# Patient Record
Sex: MALE | Race: OTHER | NOT HISPANIC OR LATINO | Employment: OTHER | URBAN - METROPOLITAN AREA
[De-identification: names, ages, dates, MRNs, and addresses within clinical notes are randomized per-mention and may not be internally consistent; named-entity substitution may affect disease eponyms.]

---

## 2024-11-07 ENCOUNTER — TELEPHONE (OUTPATIENT)
Age: 89
End: 2024-11-07

## 2024-11-07 DIAGNOSIS — M06.09 RHEUMATOID ARTHRITIS WITHOUT RHEUMATOID FACTOR, MULTIPLE SITES (HCC): Primary | ICD-10-CM

## 2024-11-07 NOTE — TELEPHONE ENCOUNTER
Patient states his current routine lab orders have .  Please place new lab orders to [facility].  He will go for the lab work tomorrow.

## 2024-11-20 ENCOUNTER — OFFICE VISIT (OUTPATIENT)
Age: 89
End: 2024-11-20
Payer: COMMERCIAL

## 2024-11-20 VITALS
BODY MASS INDEX: 30.63 KG/M2 | WEIGHT: 190.6 LBS | HEIGHT: 66 IN | TEMPERATURE: 97.2 F | OXYGEN SATURATION: 97 % | HEART RATE: 63 BPM | DIASTOLIC BLOOD PRESSURE: 62 MMHG | SYSTOLIC BLOOD PRESSURE: 116 MMHG

## 2024-11-20 DIAGNOSIS — Z79.899 ENCOUNTER FOR LONG-TERM (CURRENT) USE OF MEDICATIONS: ICD-10-CM

## 2024-11-20 DIAGNOSIS — M15.0 PRIMARY GENERALIZED (OSTEO)ARTHRITIS: ICD-10-CM

## 2024-11-20 DIAGNOSIS — M06.09 RHEUMATOID ARTHRITIS OF MULTIPLE SITES WITHOUT RHEUMATOID FACTOR (HCC): Primary | ICD-10-CM

## 2024-11-20 DIAGNOSIS — G56.01 CARPAL TUNNEL SYNDROME OF RIGHT WRIST: ICD-10-CM

## 2024-11-20 PROCEDURE — 99214 OFFICE O/P EST MOD 30 MIN: CPT | Performed by: PHYSICIAN ASSISTANT

## 2024-11-20 NOTE — PROGRESS NOTES
Name: Jovon Benítez      : 1932      MRN: [id number]  Encounter Provider: Pippa Gallagher PA-C  Encounter Date: 2024   Encounter department: [facility] RHEUMATOLOGY [facility]  :  Assessment & Plan  Rheumatoid arthritis of multiple sites without rheumatoid factor (HCC)  His rheumatoid arthritis symptoms are well-controlled with subcutaneous methotrexate injections.  No signs of active inflammation or synovitis on exam today.  We will continue to monitor his response to treatment.  Labs as ordered every 3 months to monitor for medication side effects and toxicity.  Follow-up in 6 months or sooner if needed.    Orders:    CBC and differential; Standing    Comprehensive metabolic panel; Standing    Sedimentation rate, automated; Standing    C-reactive protein; Standing    Carpal tunnel syndrome of right wrist  Chronic carpal tunnel symptoms in his right wrist.  He did have an injection done in May however this did not provide any significant relief for him.  We did discuss additional treatment options including a referral to occupational therapy or orthopedics.  At this time he would like to start with a home exercise program.       Primary generalized (osteo)arthritis  Generalized osteoarthritis symptoms are stable.  Encouraged continued home exercise program.       Encounter for long-term (current) use of medications    Orders:    CBC and differential; Standing    Comprehensive metabolic panel; Standing    Sedimentation rate, automated; Standing    C-reactive protein; Standing        History of Present Illness     Jovon Benítez is a 89 y.o. male.  He is here for follow-up of his seronegative erosive rheumatoid arthritis.  He was initially started on oral methotrexate in 2014.  In  he was switched to subcutaneous methotrexate.  He is feeling well and his rheumatoid arthritis symptoms are stable with methotrexate.  He has minimal stiffness in the morning.  He has no  swelling in his joints.  He has a history of osteoarthritis as well.  These symptoms have been stable.  He does exercise regularly and stays active.     He has symptoms of carpal tunnel in his right hand.  He had an injection done by Dr. Payan in May.  Unfortunately this did not provide any significant relief for him.  He continues to have numbness in his right hand.     He has a history of reflux symptoms and a Schatzki's ring.  This was dilated with the scope and balloon with EGD done in December 2021.  His reflux and dysphagia symptoms have significantly improved and are stable.   He is also following with Urology.  He is currently on Flomax for incomplete emptying of his bladder.        He follows with his PCP for history of type 2 diabetes and hypertension.    He had labs done on 11/8/2024.  H/H11.1/32.4.  Glucose 112.  Creatinine 1.19, GFR 57.  ESR, CRP within normal limits.          Review of Systems   Constitutional:  Negative for chills, fatigue and fever.   HENT:  Negative for hearing loss, sore throat and tinnitus.    Eyes:  Negative for pain and visual disturbance.        Dry eyes   Respiratory:  Negative for cough and shortness of breath.    Cardiovascular:  Negative for chest pain and palpitations.   Gastrointestinal:  Negative for abdominal pain, nausea and vomiting.   Genitourinary:  Negative for difficulty urinating.   Musculoskeletal:  Positive for arthralgias and back pain (stable). Negative for gait problem, joint swelling, myalgias, neck pain and neck stiffness.   Skin:  Negative for rash.   Neurological:  Negative for dizziness, seizures, weakness, numbness and headaches.   Psychiatric/Behavioral:  Negative for confusion, decreased concentration and sleep disturbance.      Current Outpatient Medications on File Prior to Visit   Medication Sig Dispense Refill    aspirin (ECOTRIN LOW STRENGTH) 81 mg EC tablet Take 1 tablet by mouth daily      atorvastatin (LIPITOR) 20 mg tablet Take 20 mg by  "mouth daily      B-D INS SYR MICROFINE 1CC/28G 28G X 1/2\" 1 ML MISC USE EVERY 7 DAYS TO ADMINISTER SUBCUTANEOUSLY WEEKLY. 10 each 1    bumetanide (BUMEX) 2 mg tablet Take 0.5 tablets by mouth daily       Cholecalciferol (D3 Dots) 50 MCG (2000 UT) TBDP Take 2,000 Units by mouth      enalapril (VASOTEC) 20 mg tablet Take 1 tablet by mouth daily       ferrous sulfate 325 (65 Fe) mg tablet Take 1 tablet by mouth 2 (two) times a day with meals       finasteride (PROSCAR) 5 mg tablet Take 1 tablet by mouth daily      folic acid (FOLVITE) 1 mg tablet TAKE 1 TABLET BY MOUTH EVERY DAY 90 tablet 1    methotrexate 50 MG/2ML injection INJECT 0.7 ML (17.5 MG TOTAL) UNDER THE SKIN EVERY 7 DAYS 8 mL 1    metoprolol succinate (TOPROL-XL) 25 mg 24 hr tablet Take 1 tablet by mouth daily       Tuberculin-Allergy Syringes (B-D ALLERGY SYRINGE 1CC/28G) 28G X 1/2\" 1 ML MISC Use every 7 days Use to admin SQ MTX weekly 10 each 1    Accu-Chek Pinky Plus test strip USE AS DIRECTED TO TEST BLOOD GLUCOSE LEVELS DAILY (Patient not taking: Reported on 11/20/2024)      acetaminophen (TYLENOL) 325 mg tablet Take 2 tablets (650 mg total) by mouth every 6 (six) hours as needed for mild pain (Patient not taking: Reported on 11/20/2024) 30 tablet 0    meloxicam (MOBIC) 15 mg tablet Take 15 mg by mouth daily (Patient not taking: Reported on 11/20/2024)      pantoprazole (PROTONIX) 40 mg tablet Take 40 mg by mouth daily (Patient not taking: Reported on 11/20/2024)      tamsulosin (FLOMAX) 0.4 mg Take 0.4 mg by mouth (Patient not taking: Reported on 5/16/2024)      vitamin B-12 (VITAMIN B-12) 500 mcg tablet Take 1 tablet (500 mcg total) by mouth daily (Patient not taking: Reported on 11/20/2024) 90 tablet 1     No current facility-administered medications on file prior to visit.         Objective   /62 (BP Location: Left arm, Patient Position: Sitting, Cuff Size: Adult)   Pulse 63   Temp (!) 97.2 °F (36.2 °C) (Temporal)   Ht 5' 5.5\" (1.664 m)   " Wt 86.5 kg (190 lb 9.6 oz)   SpO2 97%   BMI 31.24 kg/m²      Physical Exam  Constitutional:       Appearance: Normal appearance.   Cardiovascular:      Rate and Rhythm: Normal rate and regular rhythm.   Pulmonary:      Breath sounds: Normal breath sounds.   Musculoskeletal:      Comments: Neck decreased lateral flexion and rotation.  Spasm noted posterior cervical and shoulder girdle muscles.  Shoulders limited abduction and external rotation.  Bilateral shoulder crepitus.  Elbows full range of motion.  Wrists full range of motion.  Tinel's negative bilaterally.  Prominent ulnar styloids with no synovitis.  Hands squaring 1st carpometacarpal joints bilaterally with Heberden's and Julio's nodes.  No synovitis appreciated.  Straight leg raising negative bilaterally.  Hips full range of motion.  Knees full range of motion with patellofemoral crepitus and early varus deformities.  Ankles full range of motion.  Feet hyperpronation.  No synovitis noted.     Skin:     General: Skin is warm and dry.   Neurological:      General: No focal deficit present.      Mental Status: He is alert.           Dragon Dictation software was used to dictate this note. It may contain errors with dictating incorrect words/spelling. Please contact provider directly for any questions.

## 2024-11-20 NOTE — PATIENT INSTRUCTIONS
Patient Education     Carpal Tunnel Exercises   About this topic   Carpal tunnel syndrome is a very common health problem. It is most often caused by doing hand or wrist movements over and over. It can also be caused by using the lower arm muscles too much.  The carpal tunnel is the small area in your wrist that your median nerve runs through. A tough band of tissues called a ligament holds everything in place over the carpal tunnel. Your median nerve runs from your neck through your lower arm into your hand. If this nerve is squeezed at the wrist area, you may feel pain and have other signs. Your hand, fingers, and wrist may feel weak, numb, or tingly. This is called carpal tunnel syndrome.  Your doctor may want you to try exercises to help your signs. Other times, you will do these exercises after surgery.  General   Before starting with a program, ask your doctor if you are healthy enough to do these exercises. Your doctor may have you work with a  or physical therapist to make a safe exercise program to meet your needs.  Stretching Exercises   Stretching exercises keep your muscles flexible. They also stop them from getting tight. Start by doing each of these stretches 2 to 3 times. In order for your body to make changes, you will need to hold these stretches for 20 to 30 seconds. Repeat each exercise 2 to 3 times each day. Do all exercises slowly.  Wrist stretches bending back ? Straighten your elbow and have your palm facing up. Keeping your elbow straight, bend your wrist back so that your fingers are now pointing to the floor. Grab your hand with your other hand and push back the wrist until you feel a stretch. If you just had surgery, you should not do this exercise until your therapist or doctor tells you it is OK.  Strengthening Exercises   Strengthening exercises keep your muscles firm and strong. Sit while doing these exercises. Be sure to use good posture. Start by repeating each exercise 2 to 3  times. Work up to doing each exercise 10 times. Try to do the exercises 2 to 3 times each day. Do all exercises slowly.  Tendon gliding exercises using 4 positions ? Start by holding your hand with your fingers straight. Then, bend only the last two joints of your fingers and move your fingers into a hook or claw position. Next, straighten your fingers and bend your knuckles to make a flat table top position. This is also called the duckbill position. Last, make a full fist. Moving your hand into all 4 positions is one exercise.  Wrist exercises:  Side-to-side ? Hold one arm still using your other hand. Move your hand from side to side.  Up and down ? Hold one arm still using your other hand. Bend your wrist up and down.  Wrist circles ? Move each wrist in a [place] in one direction. Now, move each wrist in a [place] in the other direction.           What will the results be?   Less pain, pressure, stiffness, and swelling in your wrist and hand  Ease numbness and tingling in your hand and fingers  Increased blood flow to the nerves, muscles, and joints of your wrist and hand to help healing  Increased hand and  strength  Keep your muscles and joints strong and flexible  Helpful tips   Stay active and work out to keep your muscles strong and flexible.  Be sure you do not hold your breath when exercising. This can raise your blood pressure. If you tend to hold your breath, try counting out loud when exercising. If any exercise bothers you, stop right away.  Always warm up before stretching. Heated muscles stretch much easier than cool muscles. Stretching cool muscles can lead to injury.  Try walking and swinging your arms at an easy pace for a few minutes to warm up your muscles. Do this again after exercising.  Never bounce when doing stretches.  Doing exercises before a meal may be a good way to get into a routine.  Exercise may be slightly uncomfortable, but you should not have sharp pains. If you do get sharp  pains, stop what you are doing. If the sharp pains continue, call your doctor.  Last Reviewed Date   2021-05-10  Consumer Information Use and Disclaimer   This generalized information is a limited summary of diagnosis, treatment, and/or medication information. It is not meant to be comprehensive and should be used as a tool to help the user understand and/or assess potential diagnostic and treatment options. It does NOT include all information about conditions, treatments, medications, side effects, or risks that may apply to a specific patient. It is not intended to be medical advice or a substitute for the medical advice, diagnosis, or treatment of a health care provider based on the health care provider's examination and assessment of a patient’s specific and unique circumstances. Patients must speak with a health care provider for complete information about their health, medical questions, and treatment options, including any risks or benefits regarding use of medications. This information does not endorse any treatments or medications as safe, effective, or approved for treating a specific patient. Up[employer], Inc[employer] and its affiliates disclaim any warranty or liability relating to this information or the use thereof. The use of this information is governed by the Terms of Use, available at https://www.woltersSoundCureuwer.com/en/know/clinical-effectiveness-terms   Copyright   Copyright © 2024 UpToDate, Inc. and its affiliates and/or licensors. All rights reserved.

## 2024-11-20 NOTE — ASSESSMENT & PLAN NOTE
Chronic carpal tunnel symptoms in his right wrist.  He did have an injection done in May however this did not provide any significant relief for him.  We did discuss additional treatment options including a referral to occupational therapy or orthopedics.  At this time he would like to start with a home exercise program.

## 2024-11-20 NOTE — ASSESSMENT & PLAN NOTE
His rheumatoid arthritis symptoms are well-controlled with subcutaneous methotrexate injections.  No signs of active inflammation or synovitis on exam today.  We will continue to monitor his response to treatment.  Labs as ordered every 3 months to monitor for medication side effects and toxicity.  Follow-up in 6 months or sooner if needed.    Orders:    CBC and differential; Standing    Comprehensive metabolic panel; Standing    Sedimentation rate, automated; Standing    C-reactive protein; Standing

## 2025-01-14 ENCOUNTER — TELEPHONE (OUTPATIENT)
Dept: HEMATOLOGY ONCOLOGY | Facility: CLINIC | Age: OVER 89
End: 2025-01-14

## 2025-01-14 NOTE — TELEPHONE ENCOUNTER
Spoke with patient about completing lab work prior to appointment with Nicole on 1/20. Stated the orders are in his chart. Patient verbalized understanding.

## 2025-01-16 LAB
BASOPHILS # BLD AUTO: 0 THOU/CMM (ref 0–0.1)
BASOPHILS NFR BLD AUTO: 1 %
DIFFERENTIAL METHOD BLD: ABNORMAL
EOSINOPHIL # BLD AUTO: 0.2 THOU/CMM (ref 0–0.5)
EOSINOPHIL NFR BLD AUTO: 3 %
ERYTHROCYTE [DISTWIDTH] IN BLOOD BY AUTOMATED COUNT: 14.3 % (ref 12–16)
FERRITIN SERPL-MCNC: 568 NG/ML (ref 23.9–336.2)
FOLATE SERPL-MCNC: >22.3 NG/ML
HCT VFR BLD AUTO: 37.5 % (ref 37–48)
HGB BLD-MCNC: 12.4 G/DL (ref 12.5–17)
IRON SATN MFR SERPL: 10 % (ref 20–50)
IRON SERPL-MCNC: 26 UG/DL (ref 50–212)
LYMPHOCYTES # BLD AUTO: 0.9 THOU/CMM (ref 1–3)
LYMPHOCYTES NFR BLD AUTO: 13 %
MCH RBC QN AUTO: 31.7 PG (ref 27–36)
MCHC RBC AUTO-ENTMCNC: 33.1 G/DL (ref 32–37)
MCV RBC AUTO: 96 FL (ref 80–100)
MONOCYTES # BLD AUTO: 1.1 THOU/CMM (ref 0.3–1)
MONOCYTES NFR BLD AUTO: 17 %
NEUTROPHILS # BLD AUTO: 4.5 THOU/CMM (ref 1.8–7.8)
NEUTROPHILS NFR BLD AUTO: 66 %
PLATELET # BLD AUTO: 180 THOU/CMM (ref 140–350)
PMV BLD REES-ECKER: 9.6 FL (ref 7.5–11.3)
RBC # BLD AUTO: 3.91 MILL/CMM (ref 4–5.4)
TIBC SERPL-MCNC: 248 UG/DL (ref 260–430)
TRANSFERRIN SERPL-MCNC: 177 MG/DL (ref 203–362)
VIT B12 SERPL-MCNC: 625 PG/ML (ref 180–914)
WBC # BLD AUTO: 6.7 THOU/CMM (ref 4–10.5)

## 2025-03-14 DIAGNOSIS — D63.1 ANEMIA DUE TO CHRONIC KIDNEY DISEASE, UNSPECIFIED CKD STAGE: ICD-10-CM

## 2025-03-14 DIAGNOSIS — E53.8 VITAMIN B12 DEFICIENCY: Primary | ICD-10-CM

## 2025-03-14 DIAGNOSIS — N18.9 ANEMIA DUE TO CHRONIC KIDNEY DISEASE, UNSPECIFIED CKD STAGE: ICD-10-CM

## 2025-03-18 ENCOUNTER — TELEPHONE (OUTPATIENT)
Dept: HEMATOLOGY ONCOLOGY | Facility: CLINIC | Age: OVER 89
End: 2025-03-18

## 2025-03-18 NOTE — TELEPHONE ENCOUNTER
Left voicemail for patient as a reminder to complete lab work prior to appointment with Nicole on 3/25. Stated the orders are in his chart and are non-fasting. Advised to complete at least 3 days prior to appointment. Provided [facility] number for questions.

## 2025-03-21 DIAGNOSIS — M06.09 RHEUMATOID ARTHRITIS WITHOUT RHEUMATOID FACTOR, MULTIPLE SITES (HCC): ICD-10-CM

## 2025-03-21 LAB
ALBUMIN SERPL-MCNC: 4 G/DL (ref 3.5–5.7)
ALP SERPL-CCNC: 86 U/L (ref 35–120)
ALT SERPL-CCNC: 21 U/L
ANION GAP SERPL CALCULATED.3IONS-SCNC: 9 MMOL/L (ref 3–11)
AST SERPL-CCNC: 26 U/L
BILIRUB SERPL-MCNC: 0.7 MG/DL (ref 0.2–1)
BUN SERPL-MCNC: 23 MG/DL (ref 7–28)
CALCIUM SERPL-MCNC: 9.1 MG/DL (ref 8.5–10.5)
CHLORIDE SERPL-SCNC: 104 MMOL/L (ref 100–109)
CO2 SERPL-SCNC: 28 MMOL/L (ref 21–31)
CREAT SERPL-MCNC: 1.13 MG/DL (ref 0.53–1.3)
CRP SERPL-MCNC: <1 MG/L
CYTOLOGY CMNT CVX/VAG CYTO-IMP: ABNORMAL
GFR/BSA.PRED SERPLBLD CYS-BASED-ARV: 61 ML/MIN/{1.73_M2}
GLUCOSE SERPL-MCNC: 161 MG/DL (ref 65–99)
POTASSIUM SERPL-SCNC: 3.9 MMOL/L (ref 3.5–5.2)
PROT SERPL-MCNC: 6.4 G/DL (ref 6.3–8.3)
SODIUM SERPL-SCNC: 141 MMOL/L (ref 135–145)

## 2025-03-21 RX ORDER — DICLOFENAC POTASSIUM 50 MG/1
TABLET, FILM COATED ORAL
Qty: 10 EACH | Refills: 1 | Status: SHIPPED | OUTPATIENT
Start: 2025-03-21

## 2025-03-24 ENCOUNTER — TELEPHONE (OUTPATIENT)
Age: OVER 89
End: 2025-03-24

## 2025-03-24 LAB
ALBUMIN SERPL-MCNC: 4.1 G/DL (ref 3.5–5.7)
ALP SERPL-CCNC: 90 U/L (ref 35–120)
ALT SERPL-CCNC: 24 U/L
ANION GAP SERPL CALCULATED.3IONS-SCNC: 5 MMOL/L (ref 3–11)
AST SERPL-CCNC: 27 U/L
BILIRUB SERPL-MCNC: 0.7 MG/DL (ref 0.2–1)
BUN SERPL-MCNC: 18 MG/DL (ref 7–28)
CALCIUM SERPL-MCNC: 9.2 MG/DL (ref 8.5–10.5)
CHLORIDE SERPL-SCNC: 104 MMOL/L (ref 100–109)
CO2 SERPL-SCNC: 31 MMOL/L (ref 21–31)
CREAT SERPL-MCNC: 1.03 MG/DL (ref 0.53–1.3)
CRP SERPL-MCNC: <1 MG/L
CYTOLOGY CMNT CVX/VAG CYTO-IMP: ABNORMAL
GFR/BSA.PRED SERPLBLD CYS-BASED-ARV: 68 ML/MIN/{1.73_M2}
GLUCOSE SERPL-MCNC: 161 MG/DL (ref 65–99)
POTASSIUM SERPL-SCNC: 4.4 MMOL/L (ref 3.5–5.2)
PROT SERPL-MCNC: 6.4 G/DL (ref 6.3–8.3)
SODIUM SERPL-SCNC: 140 MMOL/L (ref 135–145)

## 2025-03-24 NOTE — TELEPHONE ENCOUNTER
Spoke with patient to inform him he will need labs completed prior to appointment with Nicole on 3/25. Patient stated he had labs drawn already. I informed patient the labs he had drawn were for rheumatology and he did not get labs drawn for his appointment with hematology. I stated the orders are in his chart for [facility] labs. Informed patient I will fax the orders to [facility] [phone]. Patient verbalized understanding and stated he will go today.

## 2025-03-25 ENCOUNTER — TELEPHONE (OUTPATIENT)
Dept: HEMATOLOGY ONCOLOGY | Facility: CLINIC | Age: OVER 89
End: 2025-03-25

## 2025-04-01 ENCOUNTER — TELEPHONE (OUTPATIENT)
Dept: HEMATOLOGY ONCOLOGY | Facility: CLINIC | Age: OVER 89
End: 2025-04-01

## 2025-04-01 LAB
BASOPHILS # BLD AUTO: 0 THOU/CMM (ref 0–0.1)
BASOPHILS NFR BLD AUTO: 0 %
DIFFERENTIAL METHOD BLD: ABNORMAL
EOSINOPHIL # BLD AUTO: 0.1 THOU/CMM (ref 0–0.5)
EOSINOPHIL NFR BLD AUTO: 2 %
ERYTHROCYTE [DISTWIDTH] IN BLOOD BY AUTOMATED COUNT: 15.7 % (ref 12–16)
FERRITIN SERPL-MCNC: 688 NG/ML (ref 23.9–336.2)
FOLATE SERPL-MCNC: >22.3 NG/ML
HCT VFR BLD AUTO: 35.8 % (ref 37–48)
HGB BLD-MCNC: 12.2 G/DL (ref 12.5–17)
IRON SATN MFR SERPL: 17 % (ref 20–50)
IRON SERPL-MCNC: 46 UG/DL (ref 50–212)
LYMPHOCYTES # BLD AUTO: 1 THOU/CMM (ref 1–3)
LYMPHOCYTES NFR BLD AUTO: 16 %
MCH RBC QN AUTO: 31.9 PG (ref 27–36)
MCHC RBC AUTO-ENTMCNC: 34.2 G/DL (ref 32–37)
MCV RBC AUTO: 93 FL (ref 80–100)
MONOCYTES # BLD AUTO: 0.6 THOU/CMM (ref 0.3–1)
MONOCYTES NFR BLD AUTO: 10 %
NEUTROPHILS # BLD AUTO: 4.4 THOU/CMM (ref 1.8–7.8)
NEUTROPHILS NFR BLD AUTO: 72 %
PLATELET # BLD AUTO: 171 THOU/CMM (ref 140–350)
PMV BLD REES-ECKER: 9.6 FL (ref 7.5–11.3)
RBC # BLD AUTO: 3.83 MILL/CMM (ref 4–5.4)
TIBC SERPL-MCNC: 273 UG/DL (ref 260–430)
TRANSFERRIN SERPL-MCNC: 195 MG/DL (ref 203–362)
VIT B12 SERPL-MCNC: 758 PG/ML (ref 180–914)
WBC # BLD AUTO: 6.1 THOU/CMM (ref 4–10.5)

## 2025-04-01 NOTE — TELEPHONE ENCOUNTER
Spoke with patient as a reminder to complete lab work prior to appointment with Nicole on 4/3. Stated I will fax orders to [facility] at [phone]. Confirmed appointment date and time with patient. Confirmed with patient labs are faxed. Informed patient labs are non-fasting. Patient verbalized understanding and stated he will try to go today or tomorrow.

## 2025-04-03 ENCOUNTER — OFFICE VISIT (OUTPATIENT)
Dept: HEMATOLOGY ONCOLOGY | Facility: CLINIC | Age: OVER 89
End: 2025-04-03
Payer: COMMERCIAL

## 2025-04-03 ENCOUNTER — RESULTS FOLLOW-UP (OUTPATIENT)
Age: OVER 89
End: 2025-04-03

## 2025-04-03 VITALS
HEIGHT: 65 IN | TEMPERATURE: 97.2 F | SYSTOLIC BLOOD PRESSURE: 160 MMHG | BODY MASS INDEX: 30.32 KG/M2 | DIASTOLIC BLOOD PRESSURE: 68 MMHG | OXYGEN SATURATION: 97 % | HEART RATE: 61 BPM | WEIGHT: 182 LBS | RESPIRATION RATE: 17 BRPM

## 2025-04-03 DIAGNOSIS — N18.9 ANEMIA DUE TO CHRONIC KIDNEY DISEASE, UNSPECIFIED CKD STAGE: Primary | ICD-10-CM

## 2025-04-03 DIAGNOSIS — D63.1 ANEMIA DUE TO CHRONIC KIDNEY DISEASE, UNSPECIFIED CKD STAGE: Primary | ICD-10-CM

## 2025-04-03 DIAGNOSIS — R82.90 ABNORMAL URINALYSIS: Primary | ICD-10-CM

## 2025-04-03 DIAGNOSIS — E53.8 VITAMIN B12 DEFICIENCY: ICD-10-CM

## 2025-04-03 PROCEDURE — 99214 OFFICE O/P EST MOD 30 MIN: CPT

## 2025-04-03 NOTE — PROGRESS NOTES
Name: Jovon Benítez      : 1932      MRN: [id number]  Encounter Provider: DAWSON Díaz  Encounter Date: 4/3/2025   Encounter department: [facility] [place]  :  Assessment & Plan  Anemia due to chronic kidney disease, unspecified CKD stage    Orders:    CBC and differential; Future    Iron Panel (Includes Ferritin, Iron Sat%, Iron, and TIBC); Future    Vitamin B12 deficiency    Orders:    Vitamin B12; Future    89-year-old male with mild anemia likely secondary to inflammation and chronic kidney disease.  Recommend he cut back his oral iron supplements to once a day instead of twice a day.  He can continue taking his vitamin B12 and folate supplements daily.    In addition, recommend he mention his blood pressure to his PCP next week.  Recommend patient continue to focus on iron rich foods and eat at least 3 meals a day.  We will keep an eye on his weight.  Patient asked that I speak with his daughter-in-law, Yaneth, to give her an update.    After the office visit I spoke with Yaneth and gave her an update on patient's labs and our plan.  She is aware to contact us should his symptoms start worsening.  Informed patient we will see him back in 6 months with labs prior (CBC, iron panel, vitamin B12).  Patient is aware to contact us for any additional questions/concerns or worsening symptoms.    Return in about 6 months (around 10/3/2025) for Provide lab scripts.    History of Present Illness   Chief Complaint   Patient presents with    Follow-up     Pertinent Medical History     25: Jovon Benítez is a 89-year old male who was initially seen for consultation for anemia 10/1/2024.  Patient has PMH significant for dissection of carotid artery, CAD, HTN, CHF, carpal tunnel syndrome, rheumatoid arthritis on methotrexate weekly, BPH, CKD, history of MI.  Patient presents today for follow-up.  He is by himself and ambulates with a cane.  Patient's wife recently passed  "away.  He reports he has great support from his son and daughter-in-law.    Patient is noted to have mild anemia since 2022.  Our plan at the last office visit was for him to take oral iron supplements twice daily, vitamin B12 at 500 mcg daily and he is taking folic acid 1 mg through his PCP daily.  He is following with rheumatology and gets methotrexate injection, 17.5 mg, every week. He reports he has been on methotrexate \"for a long period of time.\"     Patient is doing well overall. Denies increased fatigue, fevers, frequent infections, drenching night sweats, decreased appetite, unintentional weight loss. No CP, SOB, palpitations. Patient denies abnormal bleeding: epistaxis, gingival bleeding, hematuria, dark tarry stools. Denies any headaches or blurry vision.  Patient reports he does have constipation where he has a bowel movement every 3 days.  He reports his constipation is worse since he started taking oral iron supplements twice a day.  Patient is noted to have lost 6 pounds since her last office visit in October 2024.    Patient's blood pressure is noted to be elevated.  He reports he takes his blood pressure medication.  Patient has not office visit with his PCP scheduled next week.    Labs:  4/1/2025: Hgb 12.2, MCV 93, WBC 6.1, platelets 171,000, serum iron 46, iron saturation 17%, ferritin 680, vitamin B12 758, folate >22.3    10/1/2024: Hgb 11.4, MCV 97, WBC 5.4, platelets 164,000, 45, iron saturation 20%, ferritin 605, vitamin B12 306, folate >22.3, no monoclonal bands noted on SPEP/UPEP, kappa/lambda Nicole times urine ratio 1.32, IgG = 797, IgA = 165, IgM = 244     8/21/2024: Hgb 11.7, MCV 99, WBC 4.4, platelets 176,000, ferritin 599, serum iron 66, iron saturation 27%     4/15/2024: Hgb 12.8, MCV 95, WBC 5.6, platelets 148,000     Review of Systems   Constitutional:  Negative for activity change, appetite change, diaphoresis, fatigue, fever and unexpected weight change.   HENT:  Negative for " "nosebleeds.    Eyes: Negative.    Respiratory:  Negative for cough, chest tightness and shortness of breath.    Cardiovascular:  Negative for chest pain, palpitations and leg swelling.   Gastrointestinal:  Negative for abdominal pain and blood in stool.   Endocrine: Negative.    Genitourinary:  Negative for hematuria.   Musculoskeletal: Negative.    Skin: Negative.    Neurological:  Negative for dizziness, light-headedness and headaches.   Hematological: Negative.            Objective   /68 (BP Location: Left arm, Patient Position: Sitting, Cuff Size: Adult)   Pulse 61   Temp (!) 97.2 °F (36.2 °C) (Temporal)   Resp 17   Ht 5' 5\" (1.651 m)   Wt 82.6 kg (182 lb)   SpO2 97%   BMI 30.29 kg/m²     Physical Exam  Constitutional:       Appearance: Normal appearance.   HENT:      Head: Normocephalic and atraumatic.   Cardiovascular:      Rate and Rhythm: Regular rhythm.      Heart sounds: Normal heart sounds.   Pulmonary:      Breath sounds: Normal breath sounds.   Musculoskeletal:      Cervical back: Normal range of motion.      Right lower leg: Edema present.      Left lower leg: Edema present.   Skin:     General: Skin is warm and dry.   Neurological:      Mental Status: He is alert and oriented to person, place, and time.   Psychiatric:         Mood and Affect: Mood normal.         Behavior: Behavior normal.         Thought Content: Thought content normal.         Judgment: Judgment normal.         Labs: I have reviewed the following labs:  Results for orders placed or performed in visit on 04/01/25   CBC and differential   Result Value Ref Range    Hemoglobin 12.2 (L) 12.5 - 17.0 g/dL    HCT 35.8 (L) 37.0 - 48.0 %    White Blood Cell Count 6.1 4.0 - 10.5 thou/cmm    Red Blood Cell Count 3.83 (L) 4.00 - 5.40 mill/cmm    Platelet Count 171 140 - 350 thou/cmm    SL AMB MPV 9.6 7.5 - 11.3 fL    MCV 93 80 - 100 fL    MCH 31.9 27.0 - 36.0 pg    MCHC 34.2 32.0 - 37.0 g/dL    RDW 15.7 12.0 - 16.0 %    Differential " Type AUTO     Neutrophils (Absolute) 4.4 1.8 - 7.8 thou/cmm    Lymphocytes (Absolute) 1.0 1.0 - 3.0 thou/cmm    Monocytes (Absolute) 0.6 0.3 - 1.0 thou/cmm    Eosinophils (Absolute) 0.1 0.0 - 0.5 thou/cmm    Basophils ABS 0.0 0.0 - 0.1 thou/cmm    Neutrophils 72 %    Lymphocytes 16 %    Monocytes 10 %    Eosinophils 2 %    Basophils PCT 0 %   TIBC Panel (incl. Iron, TIBC, % Iron Saturation)   Result Value Ref Range    Iron, Serum 46 (L) 50 - 212 ug/dL    Transferrin 195 (L) 203 - 362 mg/dL    Iron Saturation 17 (L) 20 - 50 %    Total Iron Binding Capacity (TIBC) 273 260 - 430 ug/dL   Result Value Ref Range    Ferritin 688.0 (H) 23.9 - 336.2 ng/mL   Vitamin B12   Result Value Ref Range    Vitamin B-12 758 180 - 914 pg/mL   Result Value Ref Range    FOLATE, SERUM >22.3 >5.8 ng/mL   I spent 30 minutes in chart review, counseling, coordination of care, and documentation.    This note has been generated by voice recognition software system.  Therefore, there may be spelling, grammar, and or syntax errors. Please contact if questions arise.

## 2025-04-04 LAB
BACTERIA URNS QL MICRO: ABNORMAL
GLUCOSE UR QL STRIP: NEGATIVE MG/DL
HGB UR QL STRIP: NEGATIVE MG/DL
KETONES UR QL STRIP: NEGATIVE MG/DL
LEUKOCYTE ESTERASE UR QL STRIP: NEGATIVE /UL
MUCOUS THREADS URNS QL MICRO: ABNORMAL
NITRITE UR QL STRIP: NEGATIVE
PH UR: 5 [PH] (ref 4.5–8)
PROT 24H UR-MRATE: NEGATIVE MG/DL
RBC #/AREA URNS HPF: ABNORMAL /HPF (ref 0–2)
SL AMB POCT URINE COMMENT: ABNORMAL
SP GR UR: 1.01 (ref 1–1.03)
SQUAMOUS #/AREA URNS HPF: ABNORMAL /LPF (ref 0–5)
WBC #/AREA URNS HPF: ABNORMAL /HPF (ref 0–5)

## 2025-04-04 NOTE — TELEPHONE ENCOUNTER
Patient came into the office to see why he was called. Stated he could not hear who he was talking to when he was on the phone. I advised the patient that he was called to go over his Urinalysis results. I let the patient know that there was protein found in his urine and that Pippa is recommending another urinalysis be done. I handed the patient his new script and let him know that he can get that done at any time, there is no rush. Patient stated he understood and left the office.

## 2025-04-10 LAB
BASOPHILS # BLD AUTO: 0 THOU/CMM (ref 0–0.1)
BASOPHILS NFR BLD AUTO: 0 %
DIFFERENTIAL METHOD BLD: ABNORMAL
EOSINOPHIL # BLD AUTO: 0.1 THOU/CMM (ref 0–0.5)
EOSINOPHIL NFR BLD AUTO: 2 %
ERYTHROCYTE [DISTWIDTH] IN BLOOD BY AUTOMATED COUNT: 15.9 % (ref 12–16)
HCT VFR BLD AUTO: 35.6 % (ref 37–48)
HGB BLD-MCNC: 12.1 G/DL (ref 12.5–17)
LYMPHOCYTES # BLD AUTO: 1 THOU/CMM (ref 1–3)
LYMPHOCYTES NFR BLD AUTO: 17 %
MCH RBC QN AUTO: 32.1 PG (ref 27–36)
MCHC RBC AUTO-ENTMCNC: 34 G/DL (ref 32–37)
MCV RBC AUTO: 94 FL (ref 80–100)
MONOCYTES # BLD AUTO: 0.8 THOU/CMM (ref 0.3–1)
MONOCYTES NFR BLD AUTO: 13 %
NEUTROPHILS # BLD AUTO: 4.1 THOU/CMM (ref 1.8–7.8)
NEUTROPHILS NFR BLD AUTO: 68 %
PLATELET # BLD AUTO: 169 THOU/CMM (ref 140–350)
PMV BLD REES-ECKER: 9 FL (ref 7.5–11.3)
RBC # BLD AUTO: 3.78 MILL/CMM (ref 4–5.4)
WBC # BLD AUTO: 6.1 THOU/CMM (ref 4–10.5)

## 2025-05-20 ENCOUNTER — OFFICE VISIT (OUTPATIENT)
Age: OVER 89
End: 2025-05-20
Payer: COMMERCIAL

## 2025-05-20 VITALS
DIASTOLIC BLOOD PRESSURE: 62 MMHG | TEMPERATURE: 98 F | OXYGEN SATURATION: 97 % | SYSTOLIC BLOOD PRESSURE: 122 MMHG | BODY MASS INDEX: 30.82 KG/M2 | HEIGHT: 65 IN | WEIGHT: 185 LBS | HEART RATE: 60 BPM

## 2025-05-20 DIAGNOSIS — Z79.899 ENCOUNTER FOR LONG-TERM (CURRENT) USE OF MEDICATIONS: ICD-10-CM

## 2025-05-20 DIAGNOSIS — M06.09 RHEUMATOID ARTHRITIS OF MULTIPLE SITES WITHOUT RHEUMATOID FACTOR (HCC): Primary | ICD-10-CM

## 2025-05-20 DIAGNOSIS — M19.011 PRIMARY OSTEOARTHRITIS OF BOTH SHOULDERS: ICD-10-CM

## 2025-05-20 DIAGNOSIS — M19.012 PRIMARY OSTEOARTHRITIS OF BOTH SHOULDERS: ICD-10-CM

## 2025-05-20 PROCEDURE — 99214 OFFICE O/P EST MOD 30 MIN: CPT | Performed by: PHYSICIAN ASSISTANT

## 2025-05-20 RX ORDER — METHOTREXATE 25 MG/ML
18.5 INJECTION, SOLUTION INTRAMUSCULAR; INTRATHECAL; INTRAVENOUS; SUBCUTANEOUS
Qty: 8 ML | Refills: 1 | Status: SHIPPED | OUTPATIENT
Start: 2025-05-20 | End: 2025-11-16

## 2025-05-20 RX ORDER — FOLIC ACID 1 MG/1
1000 TABLET ORAL DAILY
Qty: 90 TABLET | Refills: 1 | Status: SHIPPED | OUTPATIENT
Start: 2025-05-20

## 2025-05-20 RX ORDER — SYRINGE WITH NEEDLE, 1 ML 28GX1/2"
SYRINGE, EMPTY DISPOSABLE MISCELLANEOUS
Qty: 10 EACH | Refills: 1 | Status: SHIPPED | OUTPATIENT
Start: 2025-05-20

## 2025-05-20 RX ORDER — AMLODIPINE BESYLATE 5 MG/1
5 TABLET ORAL DAILY
COMMUNITY
Start: 2025-04-28 | End: 2026-04-28

## 2025-05-20 NOTE — ASSESSMENT & PLAN NOTE
"His rheumatoid arthritis is well-controlled with subcutaneous methotrexate injections.  No signs of active inflammation or synovitis on exam today.  We will continue to monitor his response to treatment.  Labs as ordered to monitor for medication side effects and toxicity.  Follow-up in 6 months or sooner if needed.    Orders:    CBC and differential; Standing    Comprehensive metabolic panel; Standing    Sedimentation rate, automated; Standing    C-reactive protein; Standing    Ambulatory referral to Physical Therapy; Future    methotrexate 50 MG/2ML injection; Inject 0.7 mL (17.5 mg total) under the skin every 7 days    folic acid (FOLVITE) 1 mg tablet; Take 1 tablet (1,000 mcg total) by mouth daily    Tuberculin-Allergy Syringes (B-D ALLERGY SYRINGE 1CC/28G) 28G X 1/2\" 1 ML MISC; Use every 7 days Use to admin SQ MTX weekly    "

## 2025-05-20 NOTE — ASSESSMENT & PLAN NOTE
He is having more pain in his right shoulder.  Recommend course of physical therapy.  I have placed a referral for [facility] to do home PT.  Encouraged regular home exercise program as well.    Orders:    Ambulatory referral to Physical Therapy; Future

## 2025-05-20 NOTE — PROGRESS NOTES
"Name: Jovon Benítez      : 1932      MRN: [id number]  Encounter Provider: Pippa Gallagher PA-C  Encounter Date: 2025   Encounter department: [facility] RHEUMATOLOGY [facility]  :  Assessment & Plan  Rheumatoid arthritis of multiple sites without rheumatoid factor (HCC)  His rheumatoid arthritis is well-controlled with subcutaneous methotrexate injections.  No signs of active inflammation or synovitis on exam today.  We will continue to monitor his response to treatment.  Labs as ordered to monitor for medication side effects and toxicity.  Follow-up in 6 months or sooner if needed.    Orders:    CBC and differential; Standing    Comprehensive metabolic panel; Standing    Sedimentation rate, automated; Standing    C-reactive protein; Standing    Ambulatory referral to Physical Therapy; Future    methotrexate 50 MG/2ML injection; Inject 0.7 mL (17.5 mg total) under the skin every 7 days    folic acid (FOLVITE) 1 mg tablet; Take 1 tablet (1,000 mcg total) by mouth daily    Tuberculin-Allergy Syringes (B-D ALLERGY SYRINGE 1CC/28G) 28G X 1/2\" 1 ML MISC; Use every 7 days Use to admin SQ MTX weekly    Primary osteoarthritis of both shoulders  He is having more pain in his right shoulder.  Recommend course of physical therapy.  I have placed a referral for [facility] physical therapy to do home PT.  Encouraged regular home exercise program as well.    Orders:    Ambulatory referral to Physical Therapy; Future    Encounter for long-term (current) use of medications    Orders:    CBC and differential; Standing    Comprehensive metabolic panel; Standing    Sedimentation rate, automated; Standing    C-reactive protein; Standing        History of Present Illness   Jovon Benítez is a 89 y.o. male.  He is here for follow-up of his seronegative erosive rheumatoid arthritis.  He was initially started on oral methotrexate in 2014.  In  he was switched to subcutaneous methotrexate.  He is " feeling well and his rheumatoid arthritis symptoms are stable with methotrexate.  He has minimal stiffness in the morning.  He has no swelling in his joints.  He has a history of osteoarthritis as well.  He does exercise regularly and stays active.  More recently he has been having pain in his right shoulder.  The symptoms started a few days ago.  He has some difficulty with range of motion in his right shoulder.     He has symptoms of carpal tunnel in his right hand.  He had an injection done but unfortunately this did not provide any significant relief for him.  He continues to have numbness in his right hand.     He has a history of reflux symptoms and a Schatzki's ring.  This was dilated with the scope and balloon with EGD done in December 2021.  His reflux and dysphagia symptoms have significantly improved and are stable.   He is also following with Urology.  He is currently on Flomax for incomplete emptying of his bladder.        He follows with his PCP for history of type 2 diabetes and hypertension.    He had labs done on 3/24/2025.  H/H12.5/36.5.  Creatinine 1.03, GFR 68.  Glucose 161.  ESR, CRP within normal limits.            Review of Systems   Constitutional:  Negative for chills, fatigue and fever.   HENT:  Negative for hearing loss, sore throat and tinnitus.    Eyes:  Negative for pain and visual disturbance.        Dry eyes   Respiratory:  Negative for cough and shortness of breath.    Cardiovascular:  Negative for chest pain and palpitations.   Gastrointestinal:  Negative for abdominal pain, nausea and vomiting.   Genitourinary:  Negative for difficulty urinating.   Musculoskeletal:  Positive for arthralgias and back pain (stable). Negative for gait problem, joint swelling, myalgias, neck pain and neck stiffness.   Skin:  Negative for rash.   Neurological:  Negative for dizziness, seizures, weakness, numbness and headaches.   Psychiatric/Behavioral:  Negative for confusion, decreased concentration  "and sleep disturbance.      Medications Ordered Prior to Encounter[1]      Objective   /62 (BP Location: Left arm, Patient Position: Sitting, Cuff Size: Adult)   Pulse 60   Temp 98 °F (36.7 °C) (Tympanic)   Ht 5' 5\" (1.651 m)   Wt 83.9 kg (185 lb)   SpO2 97%   BMI 30.79 kg/m²      Physical Exam  Constitutional:       Appearance: Normal appearance.     Cardiovascular:      Rate and Rhythm: Normal rate and regular rhythm.   Pulmonary:      Breath sounds: Normal breath sounds.     Musculoskeletal:      Comments: Neck decreased lateral flexion and rotation.  Spasm noted posterior cervical and shoulder girdle muscles.  Shoulders limited abduction and external rotation.  Bilateral shoulder crepitus.  Elbows full range of motion.  Wrists full range of motion.  Tinel's negative bilaterally.  Prominent ulnar styloids with no synovitis.  Hands squaring 1st carpometacarpal joints bilaterally with Heberden's and Julio's nodes.  No synovitis appreciated.  Straight leg raising negative bilaterally.  Hips full range of motion.  Knees full range of motion with patellofemoral crepitus and early varus deformities.  Ankles full range of motion.  Feet hyperpronation.  No synovitis noted.       Skin:     General: Skin is warm and dry.     Neurological:      General: No focal deficit present.      Mental Status: He is alert.             Dragon Dictation software was used to dictate this note. It may contain errors with dictating incorrect words/spelling. Please contact provider directly for any questions.         [1]   Current Outpatient Medications on File Prior to Visit   Medication Sig Dispense Refill    amLODIPine (NORVASC) 5 mg tablet Take 5 mg by mouth daily      aspirin (ECOTRIN LOW STRENGTH) 81 mg EC tablet Take 1 tablet by mouth in the morning.      atorvastatin (LIPITOR) 20 mg tablet Take 20 mg by mouth in the morning.      B-D INS SYR MICROFINE 1CC/28G 28G X 1/2\" 1 ML MISC USE EVERY 7 DAYS TO ADMINISTER " "SUBCUTANEOUSLY WEEKLY. 10 each 1    bumetanide (BUMEX) 2 mg tablet Take 0.5 tablets by mouth in the morning.      Cholecalciferol (D3 Dots) 50 MCG (2000 UT) TBDP Take 2,000 Units by mouth      enalapril (VASOTEC) 20 mg tablet Take 1 tablet by mouth in the morning.      ferrous sulfate 325 (65 Fe) mg tablet Take 1 tablet by mouth in the morning and 1 tablet in the evening. Take with meals.      finasteride (PROSCAR) 5 mg tablet Take 1 tablet by mouth in the morning.      metoprolol succinate (TOPROL-XL) 25 mg 24 hr tablet Take 1 tablet by mouth in the morning.      [DISCONTINUED] folic acid (FOLVITE) 1 mg tablet TAKE 1 TABLET BY MOUTH EVERY DAY 90 tablet 1    [DISCONTINUED] Tuberculin-Allergy Syringes (B-D ALLERGY SYRINGE 1CC/28G) 28G X 1/2\" 1 ML MISC Use every 7 days Use to admin SQ MTX weekly 10 each 1    Accu-Chek Pinky Plus test strip USE AS DIRECTED TO TEST BLOOD GLUCOSE LEVELS DAILY (Patient not taking: Reported on 6/8/2022)      acetaminophen (TYLENOL) 325 mg tablet Take 2 tablets (650 mg total) by mouth every 6 (six) hours as needed for mild pain (Patient not taking: Reported on 9/14/2020) 30 tablet 0    meloxicam (MOBIC) 15 mg tablet Take 15 mg by mouth daily (Patient not taking: Reported on 5/25/2023)      pantoprazole (PROTONIX) 40 mg tablet Take 40 mg by mouth daily (Patient not taking: Reported on 5/20/2025)      tamsulosin (FLOMAX) 0.4 mg Take 0.4 mg by mouth (Patient not taking: Reported on 5/16/2024)      vitamin B-12 (VITAMIN B-12) 500 mcg tablet Take 1 tablet (500 mcg total) by mouth daily (Patient not taking: Reported on 5/20/2025) 90 tablet 1    [DISCONTINUED] methotrexate 50 MG/2ML injection INJECT 0.7 ML (17.5 MG TOTAL) UNDER THE SKIN EVERY 7 DAYS 8 mL 1     No current facility-administered medications on file prior to visit.     "